# Patient Record
Sex: FEMALE | URBAN - METROPOLITAN AREA
[De-identification: names, ages, dates, MRNs, and addresses within clinical notes are randomized per-mention and may not be internally consistent; named-entity substitution may affect disease eponyms.]

---

## 2022-08-02 ENCOUNTER — CLAIMS CREATED FROM THE CLAIM WINDOW (OUTPATIENT)
Dept: URBAN - METROPOLITAN AREA MEDICAL CENTER 2 | Facility: MEDICAL CENTER | Age: 82
End: 2022-08-02
Payer: MEDICARE

## 2022-08-02 DIAGNOSIS — R63.4 ABNORMAL WEIGHT LOSS: ICD-10-CM

## 2022-08-02 DIAGNOSIS — D64.89 OTHER SPECIFIED ANEMIAS: ICD-10-CM

## 2022-08-02 DIAGNOSIS — K31.1 ADULT HYPERTROPHIC PYLORIC STENOSIS: ICD-10-CM

## 2022-08-02 DIAGNOSIS — R11.2 ACUTE NAUSEA WITH NONBILIOUS VOMITING: ICD-10-CM

## 2022-08-02 PROCEDURE — 99222 1ST HOSP IP/OBS MODERATE 55: CPT | Performed by: PHYSICIAN ASSISTANT

## 2022-08-03 ENCOUNTER — CLAIMS CREATED FROM THE CLAIM WINDOW (OUTPATIENT)
Dept: URBAN - METROPOLITAN AREA MEDICAL CENTER 2 | Facility: MEDICAL CENTER | Age: 82
End: 2022-08-03
Payer: MEDICARE

## 2022-08-03 DIAGNOSIS — R63.4 ABNORMAL INTENTIONAL WEIGHT LOSS: ICD-10-CM

## 2022-08-03 DIAGNOSIS — R93.3 ABN FINDINGS-GI TRACT: ICD-10-CM

## 2022-08-03 DIAGNOSIS — R11.2 ACUTE NAUSEA WITH NONBILIOUS VOMITING: ICD-10-CM

## 2022-08-03 DIAGNOSIS — C16.3 MALIGNANT NEOPLASM OF PYLORIC ANTRUM: ICD-10-CM

## 2022-08-03 PROCEDURE — 43239 EGD BIOPSY SINGLE/MULTIPLE: CPT | Performed by: INTERNAL MEDICINE

## 2022-08-04 ENCOUNTER — CLAIMS CREATED FROM THE CLAIM WINDOW (OUTPATIENT)
Dept: URBAN - METROPOLITAN AREA MEDICAL CENTER 2 | Facility: MEDICAL CENTER | Age: 82
End: 2022-08-04
Payer: MEDICARE

## 2022-08-04 DIAGNOSIS — R63.4 ABNORMAL WEIGHT LOSS: ICD-10-CM

## 2022-08-04 DIAGNOSIS — D64.89 ANEMIA DUE TO OTHER CAUSE: ICD-10-CM

## 2022-08-04 DIAGNOSIS — K31.1 ADULT HYPERTROPHIC PYLORIC STENOSIS: ICD-10-CM

## 2022-08-04 PROCEDURE — 99232 SBSQ HOSP IP/OBS MODERATE 35: CPT | Performed by: INTERNAL MEDICINE

## 2022-08-09 PROBLEM — 187740000: Status: ACTIVE | Noted: 2022-08-09

## 2024-03-20 ENCOUNTER — OV NP (OUTPATIENT)
Dept: URBAN - METROPOLITAN AREA CLINIC 113 | Facility: CLINIC | Age: 84
End: 2024-03-20
Payer: MEDICARE

## 2024-03-20 VITALS
HEART RATE: 70 BPM | RESPIRATION RATE: 20 BRPM | SYSTOLIC BLOOD PRESSURE: 166 MMHG | WEIGHT: 137.2 LBS | HEIGHT: 67 IN | BODY MASS INDEX: 21.53 KG/M2 | TEMPERATURE: 97.3 F | DIASTOLIC BLOOD PRESSURE: 137 MMHG

## 2024-03-20 DIAGNOSIS — Z98.890 HISTORY OF GASTRIC SURGERY: ICD-10-CM

## 2024-03-20 DIAGNOSIS — R79.89 ELEVATED LFTS: ICD-10-CM

## 2024-03-20 DIAGNOSIS — Z85.028 HISTORY OF GASTRIC CANCER: ICD-10-CM

## 2024-03-20 PROBLEM — 473061005: Status: ACTIVE | Noted: 2024-03-20

## 2024-03-20 PROCEDURE — 99213 OFFICE O/P EST LOW 20 MIN: CPT | Performed by: NURSE PRACTITIONER

## 2024-03-20 NOTE — HPI-TODAY'S VISIT:
This is an 83-year-old woman who was seen in August 2022 by Dr. Zhu while inpatient.  We were consulted for an abnormal CT of GI tract, nausea with vomiting, weight loss, and CT imaging revealing irregular antral region and dilated stomach.  Unfortunately, at the time of dictation, I do not have any hospital records from that visit other than her EGD and path report. EGD 8/3/2022 revealed a normal esophagus, a large amount of food residue in the entire examined stomach, and a large fungating and infiltrative circumferential mass found in the gastric antrum.  Biopsies were obtained.  In the gastric antrum, prepyloric region there is a firm lesion with erythema and edema as well as narrowing.  The endoscope was not able to pass this region as there appeared to be near complete obstruction.  Pathology of this lesion revealed adenocarcinoma.  On 8/6/2022, she underwent diagnostic laparoscopy, open gastrectomy, distal subtotal with Mireya-en-Y gastrojejunostomy reconstruction and open cholecystectomy. Stomach biopsies revealed invasive moderately differentiated adenocarcinoma with invasion of muscularis propria and with suppurative inflammation noted.  1 of 26 lymph nodes positive for metastatic adenocarcinoma.  Margins negative with distal margin 2.1 cm. PET/CT from 8/14/2023 showed no avid mass in the liver, status postcholecystectomy.  No mass in the spleen, pancreas, adrenals, kidneys and ureters.  She is status post partial gastrectomy.  Physiologic FDG uptake in the bowel.  No mass in the bladder.  No suspicious avid osseous or soft tissue lesions.  No definitive findings of malignancy.  According to notes from Dr. Belcher, she has been followed for a large suspicious hepatic lesion status post CT-guided biopsy with placement of pigtail for hepatic abscess in August 2022.  In January 2024, she was noted to have worsening LFTs.  , , , hepatitis A IgG positive though IgM nonreactive, .  She tells me that she is doing well. She endorses a good appetite. She normally weighs around 135 to 140 pounds. She is tolerating three meals per day. There is no trouble with swallowing. She denies any heartburn. She does have a good bit of gas. She has bowel movements almost daily without blood or melena. She denies any dark urine, jaundice or icterus.

## 2024-03-21 LAB
A/G RATIO: 1.2
ALBUMIN: 4.2
ALKALINE PHOSPHATASE: 319
ALT (SGPT): 58
AST (SGOT): 69
BILIRUBIN, TOTAL: 0.6
BUN/CREATININE RATIO: (no result)
BUN: 17
CALCIUM: 9.4
CARBON DIOXIDE, TOTAL: 21
CHLORIDE: 107
CREATININE: 0.94
EGFR: 60
GLOBULIN, TOTAL: 3.4
GLUCOSE: 74
HEMATOCRIT: 35.2
HEMOGLOBIN: 11.2
MCH: 28.4
MCHC: 31.8
MCV: 89.3
MPV: 12
PLATELET COUNT: 176
POTASSIUM: 4
PROTEIN, TOTAL: 7.6
RDW: 12
RED BLOOD CELL COUNT: 3.94
SODIUM: 140
WHITE BLOOD CELL COUNT: 5.1

## 2024-05-21 ENCOUNTER — OFFICE VISIT (OUTPATIENT)
Dept: URBAN - METROPOLITAN AREA CLINIC 113 | Facility: CLINIC | Age: 84
End: 2024-05-21
Payer: COMMERCIAL

## 2024-05-21 ENCOUNTER — DASHBOARD ENCOUNTERS (OUTPATIENT)
Age: 84
End: 2024-05-21

## 2024-05-21 VITALS
HEART RATE: 78 BPM | RESPIRATION RATE: 20 BRPM | DIASTOLIC BLOOD PRESSURE: 82 MMHG | TEMPERATURE: 97.2 F | SYSTOLIC BLOOD PRESSURE: 161 MMHG | BODY MASS INDEX: 20.34 KG/M2 | HEIGHT: 67 IN | WEIGHT: 129.6 LBS

## 2024-05-21 DIAGNOSIS — Z85.028 HISTORY OF GASTRIC CANCER: ICD-10-CM

## 2024-05-21 DIAGNOSIS — R79.89 ELEVATED LFTS: ICD-10-CM

## 2024-05-21 PROCEDURE — 99213 OFFICE O/P EST LOW 20 MIN: CPT | Performed by: INTERNAL MEDICINE

## 2024-05-21 RX ORDER — AMLODIPINE BESYLATE 10 MG/1
1 TABLET TABLET ORAL ONCE A DAY
Status: ACTIVE | COMMUNITY

## 2024-05-21 RX ORDER — LOSARTAN POTASSIUM 25 MG/1
1 TABLET TABLET, FILM COATED ORAL ONCE A DAY
Status: ACTIVE | COMMUNITY

## 2024-05-21 RX ORDER — ATORVASTATIN CALCIUM 10 MG/1
1 TABLET TABLET, FILM COATED ORAL ONCE A DAY
Status: ACTIVE | COMMUNITY

## 2024-05-21 RX ORDER — PANTOPRAZOLE SODIUM 40 MG/1
1 TABLET TABLET, DELAYED RELEASE ORAL ONCE A DAY
Status: ACTIVE | COMMUNITY

## 2024-05-21 RX ORDER — METOPROLOL TARTRATE 25 MG/1
1 TABLET WITH FOOD TABLET, FILM COATED ORAL TWICE A DAY
Status: ACTIVE | COMMUNITY

## 2024-07-31 ENCOUNTER — OFFICE VISIT (OUTPATIENT)
Dept: URBAN - METROPOLITAN AREA CLINIC 113 | Facility: CLINIC | Age: 84
End: 2024-07-31

## 2024-07-31 NOTE — HPI-TODAY'S VISIT:
This is an 83-year-old woman who was seen in August 2022 by Dr. Zhu while inpatient.  We were consulted for an abnormal CT of GI tract, nausea with vomiting, weight loss, and CT imaging revealing irregular antral region and dilated stomach.  Unfortunately, at the time of dictation, I do not have any hospital records from that visit other than her EGD and path report. EGD 8/3/2022 revealed a normal esophagus, a large amount of food residue in the entire examined stomach, and a large fungating and infiltrative circumferential mass found in the gastric antrum.  Biopsies were obtained.  In the gastric antrum, prepyloric region there is a firm lesion with erythema and edema as well as narrowing.  The endoscope was not able to pass this region as there appeared to be near complete obstruction.  Pathology of this lesion revealed adenocarcinoma.  On 8/6/2022, she underwent diagnostic laparoscopy, open gastrectomy, distal subtotal with Mireya-en-Y gastrojejunostomy reconstruction and open cholecystectomy. Stomach biopsies revealed invasive moderately differentiated adenocarcinoma with invasion of muscularis propria and with suppurative inflammation noted.  1 of 26 lymph nodes positive for metastatic adenocarcinoma.  Margins negative with distal margin 2.1 cm. PET/CT from 8/14/2023 showed no avid mass in the liver, status postcholecystectomy.  No mass in the spleen, pancreas, adrenals, kidneys and ureters.  She is status post partial gastrectomy.  Physiologic FDG uptake in the bowel.  No mass in the bladder.  No suspicious avid osseous or soft tissue lesions.  No definitive findings of malignancy.  According to notes from Dr. Belcher, she has been followed for a large suspicious hepatic lesion status post CT-guided biopsy with placement of pigtail for hepatic abscess in August 2022.  In January 2024, she was noted to have worsening LFTs.  , , , hepatitis B core antibody total negative, hepatitis B surface antibody negative, hepatitis C negative, hepatitis A IgM a negative.  IgG 1799, IgA 309, IgM 51.  There were free kappa and less so lambda free light chains.  .  She tells me she is feeling well.  She denies any abdominal pain, heartburn, nausea or vomiting or dysphagia.  She says her appetite is okay.  She has lost some weight.  She normally runs 1 .  She moves her bowels every day to every other day there is been no blood or melena.  She does not drink any alcohol.  Blood work on 5/30/2024 revealed a hemoglobin 11.2, WBC of 4.4 and platelet count 126,000.  Sodium 145 potassium 4.7 BUN 23 creatinine 1.12.  AST 44, ALT 43, alk phosphatase 303, total bili 0.3, TSH is 1.38 with a free T4 of 0.99.  Blood work on 3/20/2024 revealed a hemoglobin 11.2, WBC of 5.1 and platelet count of 176,000.  MCV is 89.  BUN is 17 creatinine 0.94.  AST 69, ALT 58, alk phosphatase 319, total bili 0.6, albumin is 4.2.

## 2024-09-04 ENCOUNTER — OFFICE VISIT (OUTPATIENT)
Dept: URBAN - METROPOLITAN AREA CLINIC 113 | Facility: CLINIC | Age: 84
End: 2024-09-04
Payer: COMMERCIAL

## 2024-09-04 VITALS
DIASTOLIC BLOOD PRESSURE: 88 MMHG | TEMPERATURE: 97.5 F | WEIGHT: 145.4 LBS | RESPIRATION RATE: 12 BRPM | BODY MASS INDEX: 22.82 KG/M2 | HEIGHT: 67 IN | HEART RATE: 63 BPM | SYSTOLIC BLOOD PRESSURE: 168 MMHG

## 2024-09-04 DIAGNOSIS — Z85.028 HISTORY OF GASTRIC CANCER: ICD-10-CM

## 2024-09-04 DIAGNOSIS — R79.89 ELEVATED LFTS: ICD-10-CM

## 2024-09-04 PROCEDURE — 99213 OFFICE O/P EST LOW 20 MIN: CPT | Performed by: INTERNAL MEDICINE

## 2024-09-04 RX ORDER — METOPROLOL TARTRATE 25 MG/1
1 TABLET WITH FOOD TABLET, FILM COATED ORAL TWICE A DAY
Status: ACTIVE | COMMUNITY

## 2024-09-04 RX ORDER — PANTOPRAZOLE SODIUM 40 MG/1
1 TABLET TABLET, DELAYED RELEASE ORAL ONCE A DAY
Status: ACTIVE | COMMUNITY

## 2024-09-04 RX ORDER — ATORVASTATIN CALCIUM 10 MG/1
1 TABLET TABLET, FILM COATED ORAL ONCE A DAY
Status: ACTIVE | COMMUNITY

## 2024-09-04 RX ORDER — LOSARTAN POTASSIUM 25 MG/1
1 TABLET TABLET, FILM COATED ORAL ONCE A DAY
Status: ACTIVE | COMMUNITY

## 2024-09-04 RX ORDER — AMLODIPINE BESYLATE 10 MG/1
1 TABLET TABLET ORAL ONCE A DAY
Status: ACTIVE | COMMUNITY

## 2024-09-04 NOTE — HPI-TODAY'S VISIT:
This is an 83-year-old woman who was seen in August 2022 by Dr. Zhu while inpatient.  We were consulted for an abnormal CT of GI tract, nausea with vomiting, weight loss, and CT imaging revealing irregular antral region and dilated stomach.  Unfortunately, at the time of dictation, I do not have any hospital records from that visit other than her EGD and path report. EGD 8/3/2022 revealed a normal esophagus, a large amount of food residue in the entire examined stomach, and a large fungating and infiltrative circumferential mass found in the gastric antrum.  Biopsies were obtained.  In the gastric antrum, prepyloric region there is a firm lesion with erythema and edema as well as narrowing.  The endoscope was not able to pass this region as there appeared to be near complete obstruction.  Pathology of this lesion revealed adenocarcinoma.  On 8/6/2022, she underwent diagnostic laparoscopy, open gastrectomy, distal subtotal with Mireya-en-Y gastrojejunostomy reconstruction and open cholecystectomy. Stomach biopsies revealed invasive moderately differentiated adenocarcinoma with invasion of muscularis propria and with suppurative inflammation noted.  1 of 26 lymph nodes positive for metastatic adenocarcinoma.  Margins negative with distal margin 2.1 cm. PET/CT from 8/14/2023 showed no avid mass in the liver, status postcholecystectomy.  No mass in the spleen, pancreas, adrenals, kidneys and ureters.  She is status post partial gastrectomy.  Physiologic FDG uptake in the bowel.  No mass in the bladder.  No suspicious avid osseous or soft tissue lesions.  No definitive findings of malignancy.  According to notes from Dr. Belcher, she has been followed for a large suspicious hepatic lesion status post CT-guided biopsy with placement of pigtail for hepatic abscess in August 2022.  In January 2024, she was noted to have worsening LFTs.  , , , hepatitis B core antibody total negative, hepatitis B surface antibody negative, hepatitis C negative, hepatitis A IgM a negative.  IgG 1799, IgA 309, IgM 51.  There were free kappa and less so lambda free light chains.  .  She is feeling better and her appetite is much better and she is eating well and has gained weight.  There is no abdominal pain, heartburn or dysphagia.  There is no fevers or chills.  She denies any nausea or vomiting.  She typically moves her bowels every day there is been no blood or melena.  She was due for LFTs at last visit but did not get those done.  She does not remember her last oncology appointment.  Blood work on 5/30/2024 revealed a hemoglobin 11.2, WBC of 4.4 and platelet count 126,000.  Sodium 145 potassium 4.7 BUN 23 creatinine 1.12.  AST 44, ALT 43, alk phosphatase 303, total bili 0.3, TSH is 1.38 with a free T4 of 0.99.  Blood work on 3/20/2024 revealed a hemoglobin 11.2, WBC of 5.1 and platelet count of 176,000.  MCV is 89.  BUN is 17 creatinine 0.94.  AST 69, ALT 58, alk phosphatase 319, total bili 0.6, albumin is 4.2.

## 2024-09-05 LAB
A/G RATIO: 1.3
ABSOLUTE BASOPHILS: 32
ABSOLUTE EOSINOPHILS: 113
ABSOLUTE LYMPHOCYTES: 2174
ABSOLUTE MONOCYTES: 432
ABSOLUTE NEUTROPHILS: 1751
ALBUMIN: 4.5
ALKALINE PHOSPHATASE: 161
ALT (SGPT): 26
AST (SGOT): 33
BASOPHILS: 0.7
BILIRUBIN, TOTAL: 0.7
BUN/CREATININE RATIO: 17
BUN: 17
CALCIUM: 9.4
CARBON DIOXIDE, TOTAL: 26
CHLORIDE: 104
CREATININE: 1.02
EGFR: 55
EOSINOPHILS: 2.5
GLOBULIN, TOTAL: 3.6
GLUCOSE: 82
HEMATOCRIT: 37.9
HEMOGLOBIN: 11.8
LYMPHOCYTES: 48.3
MCH: 28.9
MCHC: 31.1
MCV: 92.9
MONOCYTES: 9.6
MPV: 12
NEUTROPHILS: 38.9
PLATELET COUNT: 155
POTASSIUM: 4.6
PROTEIN, TOTAL: 8.1
RDW: 11.9
RED BLOOD CELL COUNT: 4.08
SODIUM: 140
WHITE BLOOD CELL COUNT: 4.5